# Patient Record
Sex: FEMALE | Race: WHITE | NOT HISPANIC OR LATINO | Employment: FULL TIME | URBAN - METROPOLITAN AREA
[De-identification: names, ages, dates, MRNs, and addresses within clinical notes are randomized per-mention and may not be internally consistent; named-entity substitution may affect disease eponyms.]

---

## 2024-02-07 ENCOUNTER — TELEPHONE (OUTPATIENT)
Dept: ORTHOPEDIC SURGERY | Facility: CLINIC | Age: 34
End: 2024-02-07
Payer: COMMERCIAL

## 2024-02-28 ENCOUNTER — OFFICE VISIT (OUTPATIENT)
Dept: PRIMARY CARE | Facility: CLINIC | Age: 34
End: 2024-02-28
Payer: COMMERCIAL

## 2024-02-28 VITALS
BODY MASS INDEX: 25.34 KG/M2 | WEIGHT: 134.2 LBS | HEART RATE: 74 BPM | TEMPERATURE: 98.1 F | DIASTOLIC BLOOD PRESSURE: 70 MMHG | SYSTOLIC BLOOD PRESSURE: 130 MMHG | HEIGHT: 61 IN | OXYGEN SATURATION: 100 %

## 2024-02-28 DIAGNOSIS — Z00.00 PHYSICAL EXAM: Primary | ICD-10-CM

## 2024-02-28 DIAGNOSIS — F41.9 ANXIETY: ICD-10-CM

## 2024-02-28 DIAGNOSIS — R53.83 TIRED: ICD-10-CM

## 2024-02-28 PROCEDURE — 99395 PREV VISIT EST AGE 18-39: CPT | Performed by: FAMILY MEDICINE

## 2024-02-28 PROCEDURE — 1036F TOBACCO NON-USER: CPT | Performed by: FAMILY MEDICINE

## 2024-02-28 RX ORDER — HYDROXYZINE HYDROCHLORIDE 25 MG/1
50 TABLET, FILM COATED ORAL NIGHTLY PRN
Qty: 60 TABLET | Refills: 1 | Status: SHIPPED | OUTPATIENT
Start: 2024-02-28

## 2024-02-28 RX ORDER — FLUOXETINE HYDROCHLORIDE 20 MG/1
20 CAPSULE ORAL DAILY
Qty: 30 CAPSULE | Refills: 11 | Status: SHIPPED | OUTPATIENT
Start: 2024-02-28 | End: 2025-02-27

## 2024-02-28 RX ORDER — LEVONORGESTREL 52 MG/1
1 INTRAUTERINE DEVICE INTRAUTERINE ONCE
COMMUNITY

## 2024-02-28 ASSESSMENT — PROMIS GLOBAL HEALTH SCALE
RATE_AVERAGE_FATIGUE: MILD
RATE_GENERAL_HEALTH: GOOD
RATE_MENTAL_HEALTH: POOR
RATE_PHYSICAL_HEALTH: GOOD
CARRYOUT_PHYSICAL_ACTIVITIES: COMPLETELY
RATE_AVERAGE_PAIN: 0
CARRYOUT_SOCIAL_ACTIVITIES: GOOD
RATE_QUALITY_OF_LIFE: GOOD
RATE_SOCIAL_SATISFACTION: FAIR
EMOTIONAL_PROBLEMS: OFTEN

## 2024-02-28 ASSESSMENT — PATIENT HEALTH QUESTIONNAIRE - PHQ9
2. FEELING DOWN, DEPRESSED OR HOPELESS: NOT AT ALL
1. LITTLE INTEREST OR PLEASURE IN DOING THINGS: NOT AT ALL
SUM OF ALL RESPONSES TO PHQ9 QUESTIONS 1 AND 2: 0

## 2024-02-28 ASSESSMENT — PAIN SCALES - GENERAL: PAINLEVEL: 0-NO PAIN

## 2024-02-28 NOTE — PROGRESS NOTES
"Subjective   Patient ID: Caryl Ramachandran is a 33 y.o. female who presents for Annual Exam.    Physical exam  Tired  Anxiety         Review of Systems   Constitutional:  Negative for fever.        Also see HPI   Eyes:  Negative for visual disturbance.   Respiratory:  Negative for shortness of breath.    Cardiovascular:  Negative for chest pain.   Gastrointestinal:  Negative for diarrhea and nausea.   Endocrine: Negative.    Genitourinary:  Negative for difficulty urinating.   Skin:  Negative for rash.   Neurological:  Negative for dizziness.        No focal deficits   Psychiatric/Behavioral:  Negative for suicidal ideas.    All other systems reviewed and are negative.      Objective   /70   Pulse 74   Temp 36.7 °C (98.1 °F)   Ht 1.549 m (5' 1\")   Wt 60.9 kg (134 lb 3.2 oz)   LMP  (LMP Unknown) Comment: mirena  SpO2 100%   BMI 25.36 kg/m²     Physical Exam  Vitals and nursing note reviewed.   Constitutional:       Appearance: Normal appearance.   HENT:      Head: Normocephalic and atraumatic.   Eyes:      Extraocular Movements: Extraocular movements intact.      Conjunctiva/sclera: Conjunctivae normal.   Cardiovascular:      Rate and Rhythm: Normal rate and regular rhythm.      Heart sounds: Normal heart sounds.   Pulmonary:      Effort: Pulmonary effort is normal.      Breath sounds: Normal breath sounds.      Comments: Lungs essentially CTA b/l  Abdominal:      General: There is no distension.      Palpations: Abdomen is soft. There is no mass.      Tenderness: There is no abdominal tenderness.   Musculoskeletal:      Right lower leg: No edema.      Left lower leg: No edema.   Skin:     Coloration: Skin is not jaundiced.      Findings: No rash.   Neurological:      General: No focal deficit present.      Mental Status: She is alert and oriented to person, place, and time.   Psychiatric:         Mood and Affect: Mood normal.         Behavior: Behavior normal.         Thought Content: Thought content " normal.         Judgment: Judgment normal.         Assessment/Plan   Diagnoses and all orders for this visit:  Physical exam  -     Comprehensive Metabolic Panel; Future  -     TSH with reflex to Free T4 if abnormal; Future  -     Vitamin B12; Future  -     CBC and Auto Differential; Future  Anxiety  -     hydrOXYzine HCL (Atarax) 25 mg tablet; Take 2 tablets (50 mg) by mouth as needed at bedtime (sleeping).  -     FLUoxetine (PROzac) 20 mg capsule; Take 1 capsule (20 mg) by mouth once daily.  Tired  -     Comprehensive Metabolic Panel; Future  -     TSH with reflex to Free T4 if abnormal; Future  -     Vitamin B12; Future  -     CBC and Auto Differential; Future

## 2024-03-12 ENCOUNTER — APPOINTMENT (OUTPATIENT)
Dept: PRIMARY CARE | Facility: CLINIC | Age: 34
End: 2024-03-12

## 2024-03-15 ASSESSMENT — ENCOUNTER SYMPTOMS
DIZZINESS: 0
ENDOCRINE NEGATIVE: 1
NAUSEA: 0
FEVER: 0
DIFFICULTY URINATING: 0
DIARRHEA: 0
SHORTNESS OF BREATH: 0

## 2024-03-26 ENCOUNTER — APPOINTMENT (OUTPATIENT)
Dept: OBSTETRICS AND GYNECOLOGY | Facility: CLINIC | Age: 34
End: 2024-03-26
Payer: COMMERCIAL

## 2024-03-27 ENCOUNTER — APPOINTMENT (OUTPATIENT)
Dept: PRIMARY CARE | Facility: CLINIC | Age: 34
End: 2024-03-27
Payer: COMMERCIAL

## 2024-03-29 ENCOUNTER — OFFICE VISIT (OUTPATIENT)
Dept: PRIMARY CARE | Facility: CLINIC | Age: 34
End: 2024-03-29
Payer: COMMERCIAL

## 2024-03-29 VITALS
WEIGHT: 131 LBS | HEIGHT: 61 IN | HEART RATE: 88 BPM | BODY MASS INDEX: 24.73 KG/M2 | OXYGEN SATURATION: 97 % | TEMPERATURE: 97.9 F | SYSTOLIC BLOOD PRESSURE: 140 MMHG | DIASTOLIC BLOOD PRESSURE: 100 MMHG

## 2024-03-29 DIAGNOSIS — F41.9 ANXIETY: Primary | ICD-10-CM

## 2024-03-29 PROCEDURE — 99213 OFFICE O/P EST LOW 20 MIN: CPT | Performed by: FAMILY MEDICINE

## 2024-03-29 RX ORDER — LORAZEPAM 1 MG/1
1 TABLET ORAL 2 TIMES DAILY PRN
Qty: 30 TABLET | Refills: 0 | Status: SHIPPED | OUTPATIENT
Start: 2024-03-29 | End: 2024-04-16

## 2024-03-29 ASSESSMENT — PAIN SCALES - GENERAL: PAINLEVEL: 0-NO PAIN

## 2024-03-29 NOTE — PROGRESS NOTES
"Subjective   Patient ID: Caryl Ramachandran is a 33 y.o. female who presents for Anxiety, Insomnia, and Medication Problem (Both not effective ).    Anxiety, not improved  No suicidal thoughts  Elevated blood pressure likely due to anxiety         Review of Systems   Constitutional:  Negative for fever.        Also see HPI   Eyes:  Negative for visual disturbance.   Respiratory:  Negative for shortness of breath.    Cardiovascular:  Negative for chest pain.   Gastrointestinal:  Negative for diarrhea and nausea.   Endocrine: Negative.    Genitourinary:  Negative for difficulty urinating.   Skin:  Negative for rash.   Neurological:  Negative for dizziness.        No focal deficits   Psychiatric/Behavioral:  Negative for suicidal ideas.    All other systems reviewed and are negative.      Objective   BP (!) 140/100   Pulse 88   Temp 36.6 °C (97.9 °F)   Ht 1.549 m (5' 1\")   Wt 59.4 kg (131 lb)   LMP 10/17/2018 Comment: mirena  SpO2 97%   BMI 24.75 kg/m²     Physical Exam  Vitals and nursing note reviewed.   Constitutional:       Appearance: Normal appearance.   HENT:      Head: Normocephalic and atraumatic.   Eyes:      Extraocular Movements: Extraocular movements intact.      Conjunctiva/sclera: Conjunctivae normal.   Cardiovascular:      Rate and Rhythm: Normal rate and regular rhythm.      Heart sounds: Normal heart sounds.   Pulmonary:      Effort: Pulmonary effort is normal.      Breath sounds: Normal breath sounds.      Comments: Lungs essentially CTA b/l  Abdominal:      General: There is no distension.      Palpations: Abdomen is soft. There is no mass.      Tenderness: There is no abdominal tenderness.   Musculoskeletal:      Right lower leg: No edema.      Left lower leg: No edema.   Skin:     Coloration: Skin is not jaundiced.      Findings: No rash.   Neurological:      General: No focal deficit present.      Mental Status: She is alert and oriented to person, place, and time.   Psychiatric:         Mood " and Affect: Mood normal.         Behavior: Behavior normal.         Thought Content: Thought content normal.         Judgment: Judgment normal.         Assessment/Plan   Diagnoses and all orders for this visit:  Anxiety  -     LORazepam (Ativan) 1 mg tablet; Take 1 tablet (1 mg) by mouth 2 times a day as needed for anxiety.

## 2024-04-01 ASSESSMENT — ENCOUNTER SYMPTOMS
NAUSEA: 0
FEVER: 0
SHORTNESS OF BREATH: 0
DIZZINESS: 0
ENDOCRINE NEGATIVE: 1
DIARRHEA: 0
DIFFICULTY URINATING: 0

## 2024-04-14 DIAGNOSIS — F41.9 ANXIETY: ICD-10-CM

## 2024-04-16 RX ORDER — LORAZEPAM 1 MG/1
1 TABLET ORAL 2 TIMES DAILY PRN
Qty: 60 TABLET | Refills: 1 | Status: SHIPPED | OUTPATIENT
Start: 2024-04-16

## 2024-05-29 ENCOUNTER — TELEPHONE (OUTPATIENT)
Dept: PRIMARY CARE | Facility: CLINIC | Age: 34
End: 2024-05-29
Payer: COMMERCIAL

## 2024-05-29 DIAGNOSIS — F41.9 ANXIETY: ICD-10-CM

## 2024-05-29 RX ORDER — IBUPROFEN AND FAMOTIDINE 26.6; 8 MG/1; MG/1
TABLET ORAL EVERY 8 HOURS
COMMUNITY
Start: 2023-04-12

## 2024-06-03 RX ORDER — HYDROXYZINE HYDROCHLORIDE 50 MG/1
50 TABLET, FILM COATED ORAL NIGHTLY PRN
Qty: 90 TABLET | Refills: 1 | Status: SHIPPED | OUTPATIENT
Start: 2024-06-03

## 2024-06-03 RX ORDER — LORAZEPAM 1 MG/1
TABLET ORAL
Refills: 0 | OUTPATIENT
Start: 2024-06-03

## 2024-06-03 RX ORDER — FLUOXETINE 20 MG/1
20 TABLET ORAL DAILY
Qty: 90 TABLET | Refills: 3 | Status: SHIPPED | OUTPATIENT
Start: 2024-06-03

## 2024-06-04 ENCOUNTER — HOSPITAL ENCOUNTER (OUTPATIENT)
Dept: RADIOLOGY | Facility: CLINIC | Age: 34
Discharge: HOME | End: 2024-06-04
Payer: COMMERCIAL

## 2024-06-04 ENCOUNTER — APPOINTMENT (OUTPATIENT)
Dept: OBSTETRICS AND GYNECOLOGY | Facility: CLINIC | Age: 34
End: 2024-06-04
Payer: COMMERCIAL

## 2024-06-04 ENCOUNTER — OFFICE VISIT (OUTPATIENT)
Dept: SPORTS MEDICINE | Facility: CLINIC | Age: 34
End: 2024-06-04
Payer: COMMERCIAL

## 2024-06-04 VITALS
DIASTOLIC BLOOD PRESSURE: 88 MMHG | SYSTOLIC BLOOD PRESSURE: 128 MMHG | HEIGHT: 61 IN | WEIGHT: 131 LBS | HEART RATE: 84 BPM | BODY MASS INDEX: 24.73 KG/M2

## 2024-06-04 DIAGNOSIS — T14.8XXA BONE BRUISE: ICD-10-CM

## 2024-06-04 DIAGNOSIS — S99.912A LEFT ANKLE INJURY, INITIAL ENCOUNTER: ICD-10-CM

## 2024-06-04 DIAGNOSIS — M25.572 ACUTE LEFT ANKLE PAIN: ICD-10-CM

## 2024-06-04 DIAGNOSIS — S93.402A SPRAIN OF LEFT ANKLE, INITIAL ENCOUNTER: ICD-10-CM

## 2024-06-04 DIAGNOSIS — S93.602A FOOT SPRAIN, LEFT, INITIAL ENCOUNTER: ICD-10-CM

## 2024-06-04 PROCEDURE — 73610 X-RAY EXAM OF ANKLE: CPT | Mod: LEFT SIDE | Performed by: RADIOLOGY

## 2024-06-04 PROCEDURE — 73610 X-RAY EXAM OF ANKLE: CPT | Mod: LT

## 2024-06-04 PROCEDURE — 29505 APPLICATION LONG LEG SPLINT: CPT | Performed by: NURSE PRACTITIONER

## 2024-06-04 PROCEDURE — 99214 OFFICE O/P EST MOD 30 MIN: CPT | Performed by: NURSE PRACTITIONER

## 2024-06-04 ASSESSMENT — PAIN SCALES - GENERAL: PAINLEVEL: 8

## 2024-06-04 ASSESSMENT — PATIENT HEALTH QUESTIONNAIRE - PHQ9
SUM OF ALL RESPONSES TO PHQ9 QUESTIONS 1 AND 2: 0
1. LITTLE INTEREST OR PLEASURE IN DOING THINGS: NOT AT ALL
2. FEELING DOWN, DEPRESSED OR HOPELESS: NOT AT ALL

## 2024-06-04 ASSESSMENT — ENCOUNTER SYMPTOMS
RESPIRATORY NEGATIVE: 1
LOSS OF SENSATION IN FEET: 0
MYALGIAS: 1
CARDIOVASCULAR NEGATIVE: 1
DEPRESSION: 0
OCCASIONAL FEELINGS OF UNSTEADINESS: 0
CONSTITUTIONAL NEGATIVE: 1
ARTHRALGIAS: 1

## 2024-06-04 NOTE — PATIENT INSTRUCTIONS
May use PRICE therapy as needed.  Start into Physical Therapy 1-2 times a week for 8-10 weeks with manual therapy as well as dry needling and IASTM  Stressed the importance of wearing shoes with good stability control to help with the biomechanics affecting the lower legs  Stressed the importance of wearing full foot insoles to help with the biomechanics affecting the lower legs  Recommendation over-the-counter calcium 500mg, 3 times a day with vitamin-D3 1314-6763+ units a day with food as well as a daily multivitamin  Recommendation over-the-counter Move Free for joint health  May take OTC Tylenol Extra Strength or OTC Tylenol Arthritis, taking one every 6-8 hours with food as needed for pain management.  Patient advised regarding the risk and/or potential adverse reactions and/or side effects of any prescribed medications along with any over-the-counter medications or any supplements used. Patient advised to seek immediate medical care if any adverse reactions occur. The patient and/or patient(s) parent(s) verbalized their understanding.  Discussed in detail with the patient to the level of their understanding the possibility in the future of regenerative injections versus corticosteroids injections  Possibility in future of MRI of LEFT ANKLE to rule out tendon vs ligament vs tear vs fracture vs other, MSK to read.  Patient was given a TALL WALKING BOOT brace for their LEFT ANKLE injury/condition. The patient is ambulatory with or without aid and feels more stable with the brace on. The brace definitely helps improve their function as well as stability. Verbal and written instructions for the use, wear schedule, cleaning and application of this brace were given. Patient was instructed that should the brace result in increased pain, decreased sensation, numbness and/or tingling, increased swelling, or an overall worsening of their medical condition; to please contact our office immediately. Orthotic/brace  management and training was provided for skin care, modifications due to healing tissues, edema changes, interruption in skin integrity and safety precautions with the Orthosis/brace.   Follow up

## 2024-06-04 NOTE — PROGRESS NOTES
Established patient  History Of Present Illness  Caryl Ramachandran is a 33 y.o. female presenting with LEFT ankle pain. Pt states that two weeks ago, while at her camper, she had tripped over a piece of wood, hurting her LEFT ankle. Pain is primarily on the lateral aspect of the ankle, around the lateral malleolus. Swelling present. Pain with plantarflexion and dorsiflexion. Denies any numbness or tingling. Rates current pain 8/10 describing it as a constant pain. No previous history of injury.  We reviewed patient x-ray results in detail.  Patient verbalizes understanding of results.  We discussed treatment options and patient appears to have a lateral ankle sprain with possible bone bruise.  After discussion we will place patient into a tall walking boot and she can follow-up in 3 weeks for reevaluation and jameel-ray.  Patient will start into physical therapy as soon as she is able and we can address at next appointment.  Patient verbalizes understanding and agreement with plan of care.    Past Medical History  She has a past medical history of Allergic and Anxiety.    Surgical History  She has no past surgical history on file.     Social History  She reports that she has never smoked. She has never been exposed to tobacco smoke. She has never used smokeless tobacco. She reports that she does not currently use alcohol after a past usage of about 3.0 standard drinks of alcohol per week. She reports that she does not use drugs.    Family History  Family History   Problem Relation Name Age of Onset    No Known Problems Mother      Colon cancer Father Oskar watts     No Known Problems Sister      No Known Problems Sister      No Known Problems Sister      No Known Problems Sister      No Known Problems Sister      No Known Problems Brother      No Known Problems Brother      No Known Problems Brother      No Known Problems Brother      No Known Problems Brother      No Known Problems Son      No Known Problems Son      No  "Known Problems Son       Allergies  [place] and Bee venom protein (honey bee)    Review of Systems  Review of Systems   Constitutional: Negative.    Respiratory: Negative.     Cardiovascular: Negative.    Musculoskeletal:  Positive for arthralgias and myalgias.   All other systems reviewed and are negative.    Last Recorded Vitals  /88 (BP Location: Right arm, Patient Position: Sitting, BP Cuff Size: Adult)   Pulse 84   Ht 1.549 m (5' 1\")   Wt 59.4 kg (131 lb)   BMI 24.75 kg/m²      Examination  Left    Edema: Positive  Ecchymosis/Bruising: Negative  Percussion Test: Positive   [place] Test: Positive     Orientation:    Positive Asymmetrical, because of  swelling.     ROM:   Positive, Decreased plantarflexion and dorsiflexion.           Muscle Strength:   Positive +4/+5 Planar Flexion, Decreased due to pain  Positive +4/+5 Dorsi Flexion, Decreased due to pain  Positive +3/+5 Inversion, Decreased due to pain  Positive +3/+5 Eversion, Decreased due to pain        Positive +4/+5 Plantarflexion in combination with inversion, Decreased due to pain  Positive +4/+5 Plantarflexion in combination with eversion, Decreased due to pain    Positive +4/+5 Dorsiflexion in combination with inversion (Posterior Tibialis), Decreased due to pain  Positive +4/+5 Dorsiflexion in combination with eversion (Peroneal Brevis), Decreased due to pain  Positive +4/+5 Dorsiflexion in combination with eversion and flexion of great toe (Peroneal Longus), Decreased due to pain.            Palpation:   Positive , Painful, Tender to Palpation over the anterior and posterior tib/fib ligaments, lateral aspect of ankle, lateral malleolus.            Vascular:   +2/+4 Dorsalis Pedis  +2/+4 Posterior Tibialis  Capillary Refill < 2 Seconds.     Leg/Ankle/Foot - Ankle Impingement:  Posterior Ankle Impingement Test: Negative.   Anterior Ankle Impingement Test: Negative.            Leg/Ankle/Foot - Ankle Instability:  Flexibility Test:  Positive "   Anterior Drawer Test:  Positive    Talar Tilt Test:  Positive   External Rotation Kleiger Plantar Flexion Test:  Negative.   External Rotation Kleiger Dorsiflexion Test:  Negative.   Squeeze Test:  Positive   Dorsiflexion Test:  Positive   Posterior Tibial Instability Test: Negative.  Peroneal Tendon Instability Test:  Positive  Horizontal Squeeze Test:  Positive  Vertical Squeeze Test:  Negative.   Plantarflexion:  Positive  Standing/Walking Test:  Positive, Painful.   Tibial Torsion Test:  Positive       Leg/Ankle/Foot - DVT:  Arianna's Sign: Negative.            Leg/Ankle/Foot - Forefoot:  Strunsky Test:  Negative.   Gaenslen Maneuver Test:  Negative.   Metatarsal Tap Test:  Negative.   Crepitation Test:  Negative.         Leg/Ankle/Foot - Hindfoot Achilles/Calcaneus:  Bledsoe Compression Test:  Negative.   Hoffa Sign:  Negative.   Achilles Tendon Tap Test:  Negative.   Heel Compression Test:  Negative.   Heel Thump Test: Negative    Feet/Foot:   Positive BILATERAL Valgus foot       Imaging and Diagnostics Review:  Plain radiographs were ordered and independently reviewed in the presence of the family.    Assessment   1. Acute left ankle pain    2. Left ankle injury, initial encounter    3. Sprain of left ankle, initial encounter    4. Foot sprain, left, initial encounter    5. Bone bruise        Plan   Treatment or Intervention:  May use PRICE therapy as needed.  Start into Physical Therapy 1-2 times a week for 8-10 weeks with manual therapy as well as dry needling and IASTM  Stressed the importance of wearing shoes with good stability control to help with the biomechanics affecting the lower legs  Stressed the importance of wearing full foot insoles to help with the biomechanics affecting the lower legs  Recommendation over-the-counter calcium 500mg, 3 times a day with vitamin-D3 8267-6910+ units a day with food as well as a daily multivitamin  Recommendation over-the-counter Move Free for joint health  May  take OTC Tylenol Extra Strength or OTC Tylenol Arthritis, taking one every 6-8 hours with food as needed for pain management.  Patient advised regarding the risk and/or potential adverse reactions and/or side effects of any prescribed medications along with any over-the-counter medications or any supplements used. Patient advised to seek immediate medical care if any adverse reactions occur. The patient and/or patient(s) parent(s) verbalized their understanding.  Discussed in detail with the patient to the level of their understanding the possibility in the future of regenerative injections versus corticosteroids injections  Possibility in future of MRI of LEFT ANKLE to rule out tendon vs ligament vs tear vs fracture vs other, MSK to read.  Patient was given a TALL WALKING BOOT brace for their LEFT ANKLE injury/condition. The patient is ambulatory with or without aid and feels more stable with the brace on. The brace definitely helps improve their function as well as stability. Verbal and written instructions for the use, wear schedule, cleaning and application of this brace were given. Patient was instructed that should the brace result in increased pain, decreased sensation, numbness and/or tingling, increased swelling, or an overall worsening of their medical condition; to please contact our office immediately. Orthotic/brace management and training was provided for skin care, modifications due to healing tissues, edema changes, interruption in skin integrity and safety precautions with the Orthosis/brace.     Diagnostic studies:      Activity Instructions, Restrictions, and Accommodations:      Consultations/Referrals:  Physical therapy    Follow-up:  3-4 weeks with Xray  Total appointment time _30_ minutes. Greater than 50% spent counseling patient on results of physical exam, treatment options as well as results of ordered imaging and treatment for results, need for PT and expected outcomes, as well as  discussing possible medications.    HUGO GUZMAN on 6/4/24 at 9:00 AM.     Hugo Guzman CNP

## 2024-06-21 ENCOUNTER — OFFICE VISIT (OUTPATIENT)
Dept: ORTHOPEDIC SURGERY | Facility: CLINIC | Age: 34
End: 2024-06-21
Payer: COMMERCIAL

## 2024-06-21 DIAGNOSIS — S93.402A SPRAIN OF LEFT ANKLE, INITIAL ENCOUNTER: Primary | ICD-10-CM

## 2024-06-21 PROCEDURE — 99203 OFFICE O/P NEW LOW 30 MIN: CPT | Performed by: STUDENT IN AN ORGANIZED HEALTH CARE EDUCATION/TRAINING PROGRAM

## 2024-06-21 PROCEDURE — 99213 OFFICE O/P EST LOW 20 MIN: CPT | Performed by: STUDENT IN AN ORGANIZED HEALTH CARE EDUCATION/TRAINING PROGRAM

## 2024-06-21 ASSESSMENT — PAIN - FUNCTIONAL ASSESSMENT: PAIN_FUNCTIONAL_ASSESSMENT: 0-10

## 2024-06-21 ASSESSMENT — PAIN SCALES - GENERAL: PAINLEVEL_OUTOF10: 7

## 2024-06-21 ASSESSMENT — PAIN DESCRIPTION - DESCRIPTORS: DESCRIPTORS: ACHING;BURNING;THROBBING;STABBING

## 2024-06-21 NOTE — PROGRESS NOTES
ORTHOPAEDIC SURGERY HISTORY & PHYSICAL     Chief Complaint:  Left ankle pain    History Of Present Illness  Caryl Ramachandran is a 33 y.o. female who presents for evaluation of left ankle pain, self-referred.  Patient reports that in mid May she sustained a twisting injury to her ankle when she fell over a piece of wood.  She reports no prior history of trauma to this ankle.  She was previously seen and evaluated by one of our sports medicine specialist and recommended to ambulate with use of a walking boot.  The boot has been cumbersome for her and so she has not been wearing it.  She is reporting persistent, 7 out of 10 pain.  This made worse with walking and standing.  She denies any associated numbness, tingling or weakness.  She has not had repeated instability events including the ankle catching, giving out or giving way.  Patient otherwise remains in her usual state of health.     Past Medical History  Past Medical History:   Diagnosis Date    Allergic     Anxiety        Surgical History  Recent Surgeries in Orthopaedic Surgery            No cases to display             Social History  Social History     Socioeconomic History    Marital status:      Spouse name: Franklin    Number of children: 3    Years of education: Not on file    Highest education level: Not on file   Occupational History    Occupation: sport medicine   Tobacco Use    Smoking status: Never     Passive exposure: Never    Smokeless tobacco: Never   Vaping Use    Vaping status: Never Used   Substance and Sexual Activity    Alcohol use: Not Currently     Alcohol/week: 3.0 standard drinks of alcohol     Types: 3 Shots of liquor per week     Comment: Not every week. Once in a while    Drug use: Never    Sexual activity: Yes     Partners: Male     Birth control/protection: I.U.D.   Other Topics Concern    Not on file   Social History Narrative    Not on file     Social Determinants of Health     Financial Resource Strain: Not on file   Food  Insecurity: Not on file   Transportation Needs: Not on file   Physical Activity: Not on file   Stress: Not on file   Social Connections: Not on file   Intimate Partner Violence: Not on file   Housing Stability: Not on file       Family History  Family History   Problem Relation Name Age of Onset    No Known Problems Mother      Colon cancer Father Oskar watts     No Known Problems Sister      No Known Problems Sister      No Known Problems Sister      No Known Problems Sister      No Known Problems Sister      No Known Problems Brother      No Known Problems Brother      No Known Problems Brother      No Known Problems Brother      No Known Problems Brother      No Known Problems Son      No Known Problems Son      No Known Problems Son          Allergies  Allergies   Allergen Reactions    [place] Anaphylaxis, Hives and Swelling    Bee Venom Protein (Honey Bee) Swelling       Review of Systems  REVIEW OF SYSTEMS  Constitutional: no unplanned weight loss  Psychiatric: no suicidal ideation  ENT: no vision changes, no sinus problems  Pulmonary: no shortness of breath  Lymphatic: no enlarged lymph nodes  Cardiovascular: no chest pain or shortness of breath  Gastrointestinal: no stomach problems  Genitourinary: no dysuria   Skin: no rashes  Endocrine: no thyroid problems  Neurological: no headache, no numbness  Hematological: no easy bruising  Musculoskeletal: Left ankle pain    Physical Exam  PHYSICAL EXAMINATION  Constitutional Exam: well developed and well nourished  Psychiatric Exam: alert and oriented, appropriate mood and behavior  Eye Exam: EOMI  Pulmonary Exam: breathing non-labored, no apparent distress  Lymphatic exam: no appreciable lymphadenopathy in the lower extremities  Cardiovascular exam: RRR to peripheral palpation, DP pulses 2+, PT 2+, toes are pink with good capillary refill, no pitting edema  Skin exam: no open lesions, rashes, abrasions or ulcerations  Neurological exam: sensation to light touch  intact in both lower extremities in peripheral and dermatomal distributions (except for any abnormalities noted in musculoskeletal exam)    Musculoskeletal exam: Left lower extremity examination.  Patient pain localized about the anterolateral ankle.  She is focally tender to palpation about the ATFL and AITFL.  Minimally tender to palpation at the CFL and peroneal tendons.  Patient has supple but painful ankle range of motion, otherwise supple and pain-free subtalar and midtarsal joint range of motion.  Patient has sensation intact light touch grossly in a saphenous, sural, superficial peroneal, deep peroneal and tibial nerve distribution.  She has intact but pain limited PF/DF/EHL.  She has 2+ DP/PT pulse palpated.  She has a positive anterolateral impingement test with positive syndesmotic squeeze test.  Positive anterior drawer, supraphysiologic talar tilt comparable to the contralateral side.    Last Recorded Vitals  There were no vitals taken for this visit.    Laboratory Results    No results found for this or any previous visit (from the past 24 hour(s)).    Radiology Results   X-ray imaging 3 view weightbearing left ankle reviewed from 06/04/2024 and independently evaluated by me demonstrates no acute fracture or dislocation.  There are increased distal medial and lateral gutter osteophytes with dorsal talar neck and distal tibial osteophytes anteriorly noted as well.  Patient has incidental note of calcaneal enthesophyte.    Assessment/Plan:  33-year-old female who my impression has left ankle pain likely stemming from ankle sprain involving at least ATFL, possibility of syndesmotic injury.  I have reviewed the diagnosis and treatment options extensively with the patient.  I recommend the patient continue weightbearing to her tolerance in her left lower extremity.  I will provide her with information regarding a lace up style ankle brace and recommend that she begin a formal physical therapy program for  ankle range of motion, proprioceptive training and strengthening.  I will plan to see the patient back in approximately 6 weeks for discussion regarding next treatment steps.  If the patient is not clinically improving, would recommend obtaining an MRI of her left ankle without contrast to more completely evaluate her lateral ligament complex and rule out the possibility of syndesmotic injury.  Upon return, patient would require three-view weightbearing left ankle.    Mazin Ross MD, VERONIKA  Department of Orthopaedic Surgery  [facility]    The diagnosis and treatment plan were reviewed with the patient. All questions were answered. The patient verbalized understanding of the treatment plan. There were no barriers to understanding identified.    Note dictated with [employer] software.  Completed without full type editing and sent to avoid delay.

## 2024-06-28 ENCOUNTER — APPOINTMENT (OUTPATIENT)
Dept: PRIMARY CARE | Facility: CLINIC | Age: 34
End: 2024-06-28
Payer: COMMERCIAL

## 2024-07-09 ENCOUNTER — APPOINTMENT (OUTPATIENT)
Dept: PRIMARY CARE | Facility: CLINIC | Age: 34
End: 2024-07-09
Payer: COMMERCIAL

## 2024-07-19 ENCOUNTER — HOSPITAL ENCOUNTER (OUTPATIENT)
Dept: RADIOLOGY | Facility: CLINIC | Age: 34
Discharge: HOME | End: 2024-07-19
Payer: COMMERCIAL

## 2024-07-19 ENCOUNTER — APPOINTMENT (OUTPATIENT)
Dept: PRIMARY CARE | Facility: CLINIC | Age: 34
End: 2024-07-19
Payer: COMMERCIAL

## 2024-07-19 ENCOUNTER — OFFICE VISIT (OUTPATIENT)
Dept: ORTHOPEDIC SURGERY | Facility: CLINIC | Age: 34
End: 2024-07-19
Payer: COMMERCIAL

## 2024-07-19 DIAGNOSIS — M25.872 IMPINGEMENT OF LEFT ANKLE JOINT: ICD-10-CM

## 2024-07-19 DIAGNOSIS — S93.402A SPRAIN OF LEFT ANKLE, INITIAL ENCOUNTER: ICD-10-CM

## 2024-07-19 DIAGNOSIS — S93.432A ANKLE SYNDESMOSIS DISRUPTION, LEFT, INITIAL ENCOUNTER: Primary | ICD-10-CM

## 2024-07-19 PROCEDURE — 73610 X-RAY EXAM OF ANKLE: CPT | Mod: LT

## 2024-07-19 PROCEDURE — 99213 OFFICE O/P EST LOW 20 MIN: CPT | Performed by: STUDENT IN AN ORGANIZED HEALTH CARE EDUCATION/TRAINING PROGRAM

## 2024-07-19 ASSESSMENT — PAIN DESCRIPTION - DESCRIPTORS: DESCRIPTORS: STABBING;TINGLING

## 2024-07-19 ASSESSMENT — PAIN - FUNCTIONAL ASSESSMENT: PAIN_FUNCTIONAL_ASSESSMENT: 0-10

## 2024-07-19 ASSESSMENT — PAIN SCALES - GENERAL: PAINLEVEL_OUTOF10: 6

## 2024-07-19 NOTE — PROGRESS NOTES
ORTHOPAEDIC SURGERY PROGRESS NOTE    Chief Complaint:  Left ankle pain    History Of Present Illness  Caryl Ramachandran is a 33 y.o. female who presents for evaluation of left ankle pain, self-referred.  Patient reports that in mid May she sustained a twisting injury to her ankle when she fell over a piece of wood.  She reports no prior history of trauma to this ankle.  She was previously seen and evaluated by one of our sports medicine specialist and recommended to ambulate with use of a walking boot.  The boot has been cumbersome for her and so she has not been wearing it.  She is reporting persistent, 7 out of 10 pain.  This made worse with walking and standing.  She denies any associated numbness, tingling or weakness.  She has not had repeated instability events including the ankle catching, giving out or giving way.  Patient otherwise remains in her usual state of health.    07/19/2024: Patient returns for follow-up of her left ankle pain.  She has trialed physical therapy and has been using a brace without significant improvement in her pain.  Pain is still rated as 6 out of 10.  Pain is made worse with walking and standing.  She is having difficulty with her activities of daily living due to pain.     Past Medical History  Past Medical History:   Diagnosis Date    Allergic     Anxiety        Surgical History  Recent Surgeries in Orthopaedic Surgery            No cases to display             Social History  Social History     Socioeconomic History    Marital status:      Spouse name: Franklin    Number of children: 3   Occupational History    Occupation: sport medicine   Tobacco Use    Smoking status: Never     Passive exposure: Never    Smokeless tobacco: Never   Vaping Use    Vaping status: Never Used   Substance and Sexual Activity    Alcohol use: Not Currently     Alcohol/week: 3.0 standard drinks of alcohol     Types: 3 Shots of liquor per week     Comment: Not every week. Once in a while    Drug  use: Never    Sexual activity: Yes     Partners: Male     Birth control/protection: I.U.D.       Family History  Family History   Problem Relation Name Age of Onset    No Known Problems Mother      Colon cancer Father Oskar watts     No Known Problems Sister      No Known Problems Sister      No Known Problems Sister      No Known Problems Sister      No Known Problems Sister      No Known Problems Brother      No Known Problems Brother      No Known Problems Brother      No Known Problems Brother      No Known Problems Brother      No Known Problems Son      No Known Problems Son      No Known Problems Son          Allergies  Allergies   Allergen Reactions    [place] Anaphylaxis, Hives and Swelling    Bee Venom Protein (Honey Bee) Swelling       Review of Systems  REVIEW OF SYSTEMS  Constitutional: no unplanned weight loss  Psychiatric: no suicidal ideation  ENT: no vision changes, no sinus problems  Pulmonary: no shortness of breath  Lymphatic: no enlarged lymph nodes  Cardiovascular: no chest pain or shortness of breath  Gastrointestinal: no stomach problems  Genitourinary: no dysuria   Skin: no rashes  Endocrine: no thyroid problems  Neurological: no headache, no numbness  Hematological: no easy bruising  Musculoskeletal: Left ankle pain    Physical Exam  PHYSICAL EXAMINATION  Constitutional Exam: well developed and well nourished  Psychiatric Exam: alert and oriented, appropriate mood and behavior  Eye Exam: EOMI  Pulmonary Exam: breathing non-labored, no apparent distress  Lymphatic exam: no appreciable lymphadenopathy in the lower extremities  Cardiovascular exam: RRR to peripheral palpation, DP pulses 2+, PT 2+, toes are pink with good capillary refill, no pitting edema  Skin exam: no open lesions, rashes, abrasions or ulcerations  Neurological exam: sensation to light touch intact in both lower extremities in peripheral and dermatomal distributions (except for any abnormalities noted in musculoskeletal  exam)    Musculoskeletal exam: Left lower extremity examination.  Patient pain continues to localize to the anterolateral ankle.  She is tender to palpation overlying the ATFL, AITFL, nontender to palpation of the CFL and peroneal tendons.  There is a mild effusion on examination today.  Patient has supple but painful ankle range of motion, I am able to range her to neutral dorsiflexion.  She is otherwise pain-free and supple subtalar midtarsal joint range of motion. Patient has sensation intact light touch grossly in a saphenous, sural, superficial peroneal, deep peroneal and tibial nerve distribution.  She has intact but pain limited PF/DF/EHL.  She has 2+ DP/PT pulse palpated.  She has a positive anterolateral impingement test with positive syndesmotic squeeze test.  Positive anterior drawer, supraphysiologic talar tilt comparable to the contralateral side.  Patient is unable to perform a single-leg hop on the left, she is on the right.  Following stabilization taping, the patient has improved performance with single-leg hop on the left.    Last Recorded Vitals  There were no vitals taken for this visit.    Laboratory Results    No results found for this or any previous visit (from the past 24 hour(s)).    Radiology Results   X-ray imaging 3 view weightbearing left ankle reviewed from 07/19/2024 and independently evaluated by me demonstrates no acute fracture or dislocation.  There are increased anterior distal tibial and dorsal talar neck osteophytes.  The ankle mortise appears well aligned.  There is no obvious increased distal tib-fib clear space.    Assessment/Plan:  33-year-old female who in my impression has left ankle sprain clinically suspicious for syndesmotic injury with positive stabilization tape test as well as syndesmotic squeeze test in the setting of anterior ankle impingement.  I have reviewed the diagnosis and treatment options with the patient.  I recommend the patient continue weightbearing to  her tolerance in her left lower extremity utilizing a lace up style ankle brace for support.  As she has had difficulty progressing with physical therapy and her pain has continued I would recommend obtaining an MRI of her left ankle without contrast to more completely evaluate her lateral ligament complex, rule out the possibility of a syndesmotic injury and an osteochondral lesion of the talus.  Upon return, patient would not require further imaging.    Mazin Ross MD, VERONIKA  Department of Orthopaedic Surgery  [facility]    The diagnosis and treatment plan were reviewed with the patient. All questions were answered. The patient verbalized understanding of the treatment plan. There were no barriers to understanding identified.    Note dictated with [employer] software.  Completed without full type editing and sent to avoid delay.

## 2024-07-22 ENCOUNTER — TELEMEDICINE (OUTPATIENT)
Dept: PRIMARY CARE | Facility: CLINIC | Age: 34
End: 2024-07-22
Payer: COMMERCIAL

## 2024-07-22 DIAGNOSIS — F41.9 ANXIETY: Primary | ICD-10-CM

## 2024-07-22 PROCEDURE — 99213 OFFICE O/P EST LOW 20 MIN: CPT | Performed by: FAMILY MEDICINE

## 2024-07-22 PROCEDURE — 1036F TOBACCO NON-USER: CPT | Performed by: FAMILY MEDICINE

## 2024-07-22 RX ORDER — BUSPIRONE HYDROCHLORIDE 10 MG/1
10 TABLET ORAL 2 TIMES DAILY
Qty: 60 TABLET | Refills: 11 | Status: SHIPPED | OUTPATIENT
Start: 2024-07-22

## 2024-07-22 RX ORDER — FLUOXETINE HYDROCHLORIDE 40 MG/1
40 CAPSULE ORAL DAILY
Qty: 30 CAPSULE | Refills: 11 | Status: SHIPPED | OUTPATIENT
Start: 2024-07-22

## 2024-07-22 ASSESSMENT — ENCOUNTER SYMPTOMS
DIARRHEA: 0
LOSS OF SENSATION IN FEET: 0
ENDOCRINE NEGATIVE: 1
FEVER: 0
SHORTNESS OF BREATH: 0
NAUSEA: 0
OCCASIONAL FEELINGS OF UNSTEADINESS: 0
DIZZINESS: 0
DEPRESSION: 0
DIFFICULTY URINATING: 0

## 2024-07-22 ASSESSMENT — PATIENT HEALTH QUESTIONNAIRE - PHQ9
2. FEELING DOWN, DEPRESSED OR HOPELESS: NOT AT ALL
SUM OF ALL RESPONSES TO PHQ9 QUESTIONS 1 AND 2: 0
1. LITTLE INTEREST OR PLEASURE IN DOING THINGS: NOT AT ALL

## 2024-07-22 NOTE — PROGRESS NOTES
Subjective   Patient ID: Caryl Ramachandran is a 33 y.o. female who presents for Anxiety (Depression, anxiety/).    Telemedicine visit with audio and video  Patient with anxiety and depression, no suicidal thoughts or plan  Stress increased, feels on edge, lacks motivation, sad at times.  Taking Fluoxetine 20 mg    Anxiety  Patient reports no chest pain, dizziness, nausea, shortness of breath or suicidal ideas.            Review of Systems   Constitutional:  Negative for fever.        Also see HPI   Eyes:  Negative for visual disturbance.   Respiratory:  Negative for shortness of breath.    Cardiovascular:  Negative for chest pain.   Gastrointestinal:  Negative for diarrhea and nausea.   Endocrine: Negative.    Genitourinary:  Negative for difficulty urinating.   Skin:  Negative for rash.   Neurological:  Negative for dizziness.        No focal deficits   Psychiatric/Behavioral:  Negative for suicidal ideas.    All other systems reviewed and are negative.      Objective   There were no vitals taken for this visit.    Physical Exam  Constitutional:       Appearance: Normal appearance.   HENT:      Head: Normocephalic and atraumatic.   Eyes:      Conjunctiva/sclera: Conjunctivae normal.   Pulmonary:      Effort: Pulmonary effort is normal.   Neurological:      Mental Status: She is oriented to person, place, and time.   Psychiatric:         Behavior: Behavior normal.         Thought Content: Thought content normal.         Judgment: Judgment normal.         Assessment/Plan   Diagnoses and all orders for this visit:  Anxiety  -     FLUoxetine (PROzac) 40 mg capsule; Take 1 capsule (40 mg) by mouth once daily.  -     busPIRone (Buspar) 10 mg tablet; Take 1 tablet (10 mg) by mouth 2 times a day.

## 2024-08-02 ENCOUNTER — APPOINTMENT (OUTPATIENT)
Dept: ORTHOPEDIC SURGERY | Facility: CLINIC | Age: 34
End: 2024-08-02
Payer: COMMERCIAL

## 2024-08-06 ENCOUNTER — APPOINTMENT (OUTPATIENT)
Dept: RADIOLOGY | Facility: CLINIC | Age: 34
End: 2024-08-06
Payer: COMMERCIAL

## 2024-08-09 ENCOUNTER — APPOINTMENT (OUTPATIENT)
Dept: ORTHOPEDIC SURGERY | Facility: CLINIC | Age: 34
End: 2024-08-09
Payer: COMMERCIAL

## 2024-10-01 ENCOUNTER — APPOINTMENT (OUTPATIENT)
Dept: OBSTETRICS AND GYNECOLOGY | Facility: CLINIC | Age: 34
End: 2024-10-01
Payer: COMMERCIAL

## 2024-10-30 ENCOUNTER — OFFICE VISIT (OUTPATIENT)
Dept: SPORTS MEDICINE | Facility: CLINIC | Age: 34
End: 2024-10-30
Payer: COMMERCIAL

## 2024-10-30 VITALS
HEART RATE: 75 BPM | BODY MASS INDEX: 24.73 KG/M2 | HEIGHT: 61 IN | WEIGHT: 131 LBS | SYSTOLIC BLOOD PRESSURE: 122 MMHG | DIASTOLIC BLOOD PRESSURE: 78 MMHG

## 2024-10-30 DIAGNOSIS — M79.645 FINGER PAIN, LEFT: ICD-10-CM

## 2024-10-30 DIAGNOSIS — L03.012 PARONYCHIA OF FINGER, LEFT: ICD-10-CM

## 2024-10-30 PROCEDURE — 3008F BODY MASS INDEX DOCD: CPT | Performed by: FAMILY MEDICINE

## 2024-10-30 RX ORDER — SULFAMETHOXAZOLE AND TRIMETHOPRIM 800; 160 MG/1; MG/1
1 TABLET ORAL 2 TIMES DAILY
Qty: 20 TABLET | Refills: 0 | Status: SHIPPED | OUTPATIENT
Start: 2024-10-30 | End: 2024-11-09

## 2024-10-30 RX ORDER — MUPIROCIN 20 MG/G
OINTMENT TOPICAL ONCE
Status: SHIPPED | OUTPATIENT
Start: 2024-10-30

## 2024-10-30 ASSESSMENT — ENCOUNTER SYMPTOMS
LOSS OF SENSATION IN FEET: 0
OCCASIONAL FEELINGS OF UNSTEADINESS: 0
DEPRESSION: 0

## 2024-11-04 ENCOUNTER — HOSPITAL ENCOUNTER (OUTPATIENT)
Dept: RADIOLOGY | Facility: CLINIC | Age: 34
Discharge: HOME | End: 2024-11-04
Payer: COMMERCIAL

## 2024-11-04 ENCOUNTER — OFFICE VISIT (OUTPATIENT)
Dept: SPORTS MEDICINE | Facility: CLINIC | Age: 34
End: 2024-11-04
Payer: COMMERCIAL

## 2024-11-04 DIAGNOSIS — L03.012 PARONYCHIA OF FINGER, LEFT: ICD-10-CM

## 2024-11-04 DIAGNOSIS — M79.645 FINGER PAIN, LEFT: ICD-10-CM

## 2024-11-04 PROCEDURE — 73130 X-RAY EXAM OF HAND: CPT | Mod: LT

## 2024-11-04 PROCEDURE — 73130 X-RAY EXAM OF HAND: CPT | Mod: LEFT SIDE | Performed by: RADIOLOGY

## 2024-11-04 RX ORDER — MUPIROCIN 20 MG/G
OINTMENT TOPICAL 2 TIMES DAILY
Status: SHIPPED | OUTPATIENT
Start: 2024-11-04

## 2024-11-04 NOTE — PROGRESS NOTES
Verbal consent of the patient and/or verbal parental consent for patients under the age of 18 have been obtained to conduct a physical examination at this office visit.    Established patient  History Of Present Illness  11/04/24 Caryl Ramachandran is a 34 y.o. female who presents for an evaluation of their Left middle finger.  Apparently she had her finger slammed in a car door and had x-rays done and there was no fracture.  Since then she has developed a paronychia that has become excessively painful and swollen and causing her some issues.  She denies any numbness, tingling, or pins and needle sensation.  She says it is throbbing throughout the day when she is trying to work.  She has been putting ice on it as well as taking some anti-inflammatories and Tylenol but it does not seem to be helping.  She additionally has been taking Bactrim DS over the past 7 days twice a day and keeping the area clean and dry.  She was additionally supposed to  Bactroban 2% cream to put on the area but has not been able to do so yet.  She still having excruciating pain on the medial aspect in the cuticle area with visible area of pus pocket.  She did get some relief last time with slight drainage but it is filled back up.  I got an MRI to take the form    All previous Progress Notes and imaging results related to this patients chief complaint have been reviewed in preparation for this examination.    Past Medical History  She has a past medical history of Allergic and Anxiety.    Surgical History  She has no past surgical history on file.     Social History  She reports that she has never smoked. She has never been exposed to tobacco smoke. She has never used smokeless tobacco. She reports that she does not currently use alcohol. She reports that she does not use drugs.    Family History  Family History   Problem Relation Name Age of Onset    No Known Problems Mother      Colon cancer Father Oskar watts     No Known Problems  Sister      No Known Problems Sister      No Known Problems Sister      No Known Problems Sister      No Known Problems Sister      No Known Problems Brother      No Known Problems Brother      No Known Problems Brother      No Known Problems Brother      No Known Problems Brother      No Known Problems Son      No Known Problems Son      No Known Problems Son          Allergies  Pana and Bee venom protein (honey bee)    Review of Systems  CONSTITUTIONAL:   Negative for weight change, loss of appetite, fatigue, weakness, fever, chills, night sweats, headaches .           HEENT:   Negative for cold, cough, sore throat, sinus pain, swollen lymph nodes.           OPHTHALMOLOGY:   Negative for diminished vision, blurred vision, loss of vision, double vision.           ALLERGY:   Negative for runny nose, scratchy throat, sinus congestion, rash, facial pressure, nasal congestion, post-nasal drip.           CARDIOLOGY:   Negative for chest pain, palpitations, murmurs, irregular heart beat, shortness of breath, leg edema, dyspnea on exertion, fatigue, dizziness.           RESPIRATORY:   Negative for chest pain, shortness of breath, swelling of the legs, asthma/copd, chest congestion, pain with breathing .           GASTROENTEROLOGY:   Negative for nausea, vomitting, heartburn, constipation, diarrhea, blood in stool, change in bowel habits, black stool.           HEMATOLOGY/LYMPH:   Negative for fatigue, loss of appetitie, easy bruising, easy bleeding, anemia, abnormal bleeding, slow healing.           ENDOCRINOLOGY:   Negative for polyuria, polydipsia, polyphagia, fatigue, weight loss, weight gain, cold intolerance, heat intolerance, diabetes.           MUSCULOSKELETAL:   Negative       DERMATOLOGY:   Positive left middle finger paronychia with swelling around the cuticle and some erythema noted       NEUROLOGY:   Negative for tingling, numbness, gait abnormality, paresthesias, weakness, sciatica.        General  Examination:  GENERAL APPEARANCE: Appears well, pleasant, and cooperative, NAD.   HEENT: Normal, unremarkable.   NECK: Supple.   HEART: RRR, normal S1S2.   LUNGS: Clear to auscultation bilaterally.   ABDOMEN: Soft, NT/ND, BS present.   EXTREMITIES: Positive left middle finger paronychia still has swelling around the cuticle however less erythema noted also  to palpation over cuticle area  SKIN:Positive left middle finger paronychia still has swelling around the cuticle however less erythema noted also  to palpation over cuticle are  NEUROLOGIC EXAM: Awake, A&O x 3, CN's II-XII grossly intact.   PSYCH: Good eye contact, appropriate mood and affect       PROCEDURE:  Once again anesthetized the area with 2% lidocaine using a 27-gauge needle then used a small 27-gauge needle to drain paronychia with pus and blood extracted after first cleaning area well with alcohol swab and Hibiclens.  Some relief of symptoms and pressure    Assessment   1. Finger pain, left        2. Paronychia of finger, left              Treatment or Intervention:  #1  Stressed the importance of keeping area clean and dry with soap and water  #2  Once again went over how to appropriately tape with a Band-Aid to pull the cuticle away from the nail  #3  Continue Bactrim DS 1 pill twice a day for 10 days until done as well as Bactroban 2% cream applied topically to affected area 2-3 times a day   #4  Continue to take over-the-counter Advil liquid gels 1-2 every 8 hours alternating with Tylenol as needed for pain and swelling  #5 Patient advised regarding the risks and/or potential adverse reactions and/or side effects of any prescribed medications along with any over-the-counter medications or any supplements used. Patient advised to seek immediate medical care if any adverse reactions occur. The patient and/or patient(s) parent(s) verbalized their understanding.   #6  PROCEDURE:  Once again anesthetized the area with 2%  lidocaine using a 27-gauge needle then used a small 27-gauge needle to drain paronychia with pus and blood extracted after first cleaning area well with alcohol swab and Hibiclens.  Some relief of symptoms and pressure    #7  If for some reason area gets worse or more painful go to the urgent care or ER  #8 F/U next week for reevaluation  Please note that this report has been produced using speech recognition software.  It may contain errors related to grammar, punctuation or spelling.  Electronically signed, but not reviewed.  MALI Frederick, Director of Sports Medicine    MELISSA MAYBERRY on 11/4/24 at 9:21 AM.     TAHIR Frederick DO

## 2025-02-18 ENCOUNTER — APPOINTMENT (OUTPATIENT)
Dept: OBSTETRICS AND GYNECOLOGY | Facility: CLINIC | Age: 35
End: 2025-02-18
Payer: COMMERCIAL

## 2025-06-02 ENCOUNTER — OFFICE VISIT (OUTPATIENT)
Dept: ORTHOPEDIC SURGERY | Facility: CLINIC | Age: 35
End: 2025-06-02
Payer: COMMERCIAL

## 2025-06-02 ENCOUNTER — HOSPITAL ENCOUNTER (OUTPATIENT)
Dept: RADIOLOGY | Facility: CLINIC | Age: 35
Discharge: HOME | End: 2025-06-02
Payer: COMMERCIAL

## 2025-06-02 VITALS
SYSTOLIC BLOOD PRESSURE: 120 MMHG | WEIGHT: 131 LBS | HEIGHT: 61 IN | HEART RATE: 71 BPM | DIASTOLIC BLOOD PRESSURE: 76 MMHG | BODY MASS INDEX: 24.73 KG/M2

## 2025-06-02 DIAGNOSIS — S63.502A LEFT WRIST SPRAIN, INITIAL ENCOUNTER: ICD-10-CM

## 2025-06-02 DIAGNOSIS — M25.532 LEFT WRIST PAIN: ICD-10-CM

## 2025-06-02 DIAGNOSIS — G56.02 CARPAL TUNNEL SYNDROME OF LEFT WRIST: Primary | ICD-10-CM

## 2025-06-02 PROCEDURE — L3908 WHO COCK-UP NONMOLDE PRE OTS: HCPCS | Performed by: NURSE PRACTITIONER

## 2025-06-02 PROCEDURE — 1036F TOBACCO NON-USER: CPT | Performed by: NURSE PRACTITIONER

## 2025-06-02 PROCEDURE — 99214 OFFICE O/P EST MOD 30 MIN: CPT | Performed by: NURSE PRACTITIONER

## 2025-06-02 PROCEDURE — 73110 X-RAY EXAM OF WRIST: CPT | Mod: LEFT SIDE | Performed by: RADIOLOGY

## 2025-06-02 PROCEDURE — 3008F BODY MASS INDEX DOCD: CPT | Performed by: NURSE PRACTITIONER

## 2025-06-02 PROCEDURE — 73110 X-RAY EXAM OF WRIST: CPT | Mod: LT

## 2025-06-02 ASSESSMENT — ENCOUNTER SYMPTOMS
MYALGIAS: 1
CARDIOVASCULAR NEGATIVE: 1
CONSTITUTIONAL NEGATIVE: 1
OCCASIONAL FEELINGS OF UNSTEADINESS: 0
LOSS OF SENSATION IN FEET: 0
ARTHRALGIAS: 1
RESPIRATORY NEGATIVE: 1
DEPRESSION: 0

## 2025-06-02 ASSESSMENT — PAIN SCALES - GENERAL: PAINLEVEL_OUTOF10: 6

## 2025-06-02 NOTE — PROGRESS NOTES
"Established patient  History Of Present Illness  Caryl Ramachandran is a 34 y.o. female presenting with LEFT wrist pain. Pt states that three weeks ago, while playing softball, when she was batting she \"felt a pop\" in her wrist. Pt has been having pain ever since. Pain located primarily on the radial aspect of the wrist and in to the forearm. Pain with all range of motion but worse with any flexion, radial and ulnar deviation and worse with any restricted and weighted movement. Denies any numbness but did notice tingling sensation in her fingers as of yesterday. Does feel that occasionally her hand is \"colder\" than her other hand. No noticeable change in  strength but does notice on occasion pain and slight difficulty with twisting things open. RIGHT hand dominate. Rate current pain as a 6/10 but notes that it can get worse depending on what she is doing.  We discussed treatment options.  Upon exam patient has indications of carpal tunnel as well as a wrist sprain with extensor and flexor tendinitis.  As such we will fit patient with a cock up splint and she can start into occupational therapy as soon as she is able.  Patient can follow-up in 6 weeks for reevaluation and we can adjust treatment as indicated at that time.  Patient verbalizes understanding and agreement plan of care.    Past Medical History  She has a past medical history of Allergic and Anxiety.    Surgical History  She has no past surgical history on file.     Social History  She reports that she has never smoked. She has never been exposed to tobacco smoke. She has never used smokeless tobacco. She reports that she does not currently use alcohol. She reports that she does not use drugs.    Family History  Family History[1]     Allergies  [place] and Bee venom protein (honey bee)    Review of Systems  Review of Systems   Constitutional: Negative.    Respiratory: Negative.     Cardiovascular: Negative.    Musculoskeletal:  Positive for arthralgias " "and myalgias.   All other systems reviewed and are negative.    Last Recorded Vitals  /76 (BP Location: Right arm, Patient Position: Sitting, BP Cuff Size: Adult)   Pulse 71   Ht 1.549 m (5' 1\")   Wt 59.4 kg (131 lb)   BMI 24.75 kg/m²      The , CAROLA COTTON was present during today's visit and not limited to physical examination!    Examination:  Left Wrist  Erythema: Negative.   Edema: Negative.   Effusion: Negative.   Warmth: Negative.   Ecchymosis/Bruising: Negative.   Percussion Test: Negative.   Tuning Fork Test: Negative.   Abrasions: Negative.   Orientation: Symmetrical.            ROM:   Positive  FROM, Painful    Wrist Flexion (80 degrees)   Wrist Extension (70 degrees)  Wrist Supination (90 degrees)  Wrist Pronation (90 degrees)         Wrist Ulnar Deviation (30 degrees)  Wrist Radial Deviation (20 degrees)          Finger MCP (100 degrees)/ PIP (100 degrees)/ DIP (90 degrees) Flexion  Finger MCP (30 degrees) /PIP (0 degrees) / DIP (20 degrees) Extension          Fingers ABduction (20 degrees)  Fingers ADduction (0 degrees)          Thumb & Fingertip Opposition  Thumb Circumduction.            Muscle Strength: Positive   +4/+5 Wrist Flexion  +4/+5 Wrist Extension        +4/+5 Wrist Supination  +4/+5 Wrist Pronation          +5/+5 Wrist Ulnar Deviation  +5/+5 Wrist Radial Deviation         +5/+5 Finger MCP/PIP/DIP Flexion  +5/+5 Finger MCP/PIP/DIP Extension          +5/+5 Finger Abduction  +5/+5 Finger Adduction          +5/+5 Finger/Thumb Opposition  +5/+5 Thumb Circumduction.            Motor/Neurological:    Normal  Tip of Thumb (Median Nerve)  Tip of 5th Finger (Ulnar Nerve)  Flex and Extend Thumb (Median and Radial Nerve)  Scissor Fingers (Ulnar Nerve)  Raise Thumb Against Resistance on Flat Surface (Median Nerve).          Sensation/Neurological:   Negative, Sensation Intact, 2 Point Discrimination Test Negative         C6: Lateral forearms, thumbs, anterior index finger, lateral " half of the middle finger  C7: Some of posterior index finger, medial half of middle finger, upper posterior back, back of arms  C8: Ring finger, little finger, medial forearm, posterior upper back.     Palpation:   Positive Tenderness to Palpation Left Radial            Vascular:   +2/+4 Radial Pulse  +2/+4 Ulnar Pulse  Capillary Refill < 2 seconds.               Wrist/Hand/Finger - Motor Function:  Trimont-Littler Test: Negative.   Retinacular Test: Negative.   Princh  Test: Negative.   Key  Test: Negative.   Power  Test: Negative.   Gene  Test: Negative.         Wrist/Hand/Finger - Nerve Lesions/Compression Neuropathies:  Carpal Tunnel Sign Test: Positive  Phalen's Sign Test: Positive  Reverse Phalen's Sign Test: Negative.   Wartenberg Sign Test: Negative.   Tinel's Sign Test: Positive  Opposition Test: Negative.   Pinch Test: Negative.   Carpal/Digital Compression Test: Positive  Ochsner Test: Negative.   Froment's Sign Ulnar Nerve Test: Negative.   Intrinsic Ulnar Nerve Test: Negative.   O Test: Negative.         Wrist/Hand/Finger - Stability:  Valgus Stess Test: Negative.   Varus Stess Test: Negative.   Moore Scaphoid Shift Test: Negative.   Scapholunate Ballotment Test: Negative.   Kemar Test: Negative.   Dorsal Capitate Displacement Apprehension Test: Negative.   Shuck Test: Negative.         Wrist/Hand/Finger - Tenosynovitis:  Flexor Digitorum Profundus Test:  Positive  Flexor Digitorum Superficialis Test:  Positive  Flexor Pollicis Longus Test:  Negative.   Flexor Pollicis Brevis Test:  Negative.   Flexor Radial Longus Test:  Negative.   Flexor Radial Brevis Test:  Negative.   Extensor Digitorum Profundus Test:  Positive  Extensor Digitorum Superficialis Test:  Positive  Extensor Pollicis Longus Test:  Negative.   Extensor Pollicis Brevis (EPB)[Dequervain's):  Negative.   Extensor Radial Longus Test:  Negative.   Extensor Radial Brevis Test:  Negative.   Muckard Test:  Negative.    Finklestein Test:  Negative.   Kanavel Sign:  Negative.   Flexor and Extensor Longus Test:  Negative.         Wrist/Hand/Finger - TFCC:  Supination Lift Test: Negative.   Grind Test: Negative.          Imaging and Diagnostics Review:  Plain radiographs ordered and independently reviewed.    Assessment   1. Carpal tunnel syndrome of left wrist    2. Left wrist pain    3. Left wrist sprain, initial encounter        Plan   Treatment or Intervention:  May use RICE therapy as needed  Start into hand/physical therapy 1-2 times a week for 8-10 weeks with manual therapy as well as dry needling and IASTM  Additionally reviewed modifying activities to be performed while exercising as well as activities with daily living  Discussed in detail with the patient to the level of their understanding the possibility in the future of regenerative injections versus corticosteroid injections  Recommendation over-the-counter calcium 500mg, 3 times a day with vitamin-D3 [id number]+ units a day with food as well as a daily multivitamin  Recommendation over-the-counter Move Free for joint health  May take OTC Tylenol Extra Strength or OTC Tylenol Arthritis, taking one every 6-8 hours with food as needed for pain management.   Patient advised regarding the risks and/or potential adverse reactions and/or side effects of any prescribed medications along with any over-the-counter medications or any supplements used. Patient advised to seek immediate medical care if any adverse reactions occur. The patient and/or patient(s) parent(s) verbalized their understanding.  Possibility in future of MRI of LEFT WRIST to rule out tendon vs ligament vs tear vs fracture vs other, MSK to read.     Diagnostic studies:    XR hand left 3+ views  Narrative: Interpreted By:  Triny Myers,   STUDY:  XR HAND LEFT 3+ VIEWS;  11/4/2024 9:34 am      INDICATION:  Signs/Symptoms:LEFT HAND PAIN.      COMPARISON:  None.      ACCESSION NUMBER(S):  [id number]       ORDERING CLINICIAN:  BLANCA PENG      FINDINGS:  No acute fracture or dislocation is seen.      No soft tissue swelling is identified. No erosions or periosteal  reaction is seen.      No radiopaque foreign bodies are seen.      Impression:         Normal exam of the hand.      MACRO:  None      Signed by: Triny Myers 11/6/2024 8:45 PM  Dictation workstation:   [id number]     Activity Instructions, Restrictions, and Accommodations:      Consultations/Referrals:  Occupational therapy    Follow-up:  Follow up 6-8 WEEKS  Total appointment time _30_ minutes. Greater than 50% spent counseling patient on results of physical exam, treatment options as well as results of ordered imaging and treatment for results, need for PT and expected outcomes, as well as discussing possible medications.    Owen Watts CNP           [1]   Family History  Problem Relation Name Age of Onset    No Known Problems Mother      Colon cancer Father Oskar watts     No Known Problems Sister      No Known Problems Sister      No Known Problems Sister      No Known Problems Sister      No Known Problems Sister      No Known Problems Brother      No Known Problems Brother      No Known Problems Brother      No Known Problems Brother      No Known Problems Brother      No Known Problems Son      No Known Problems Son      No Known Problems Son

## 2025-07-14 ENCOUNTER — OFFICE VISIT (OUTPATIENT)
Dept: ORTHOPEDIC SURGERY | Facility: CLINIC | Age: 35
End: 2025-07-14
Payer: COMMERCIAL

## 2025-07-14 ENCOUNTER — HOSPITAL ENCOUNTER (OUTPATIENT)
Dept: RADIOLOGY | Facility: CLINIC | Age: 35
Discharge: HOME | End: 2025-07-14
Payer: COMMERCIAL

## 2025-07-14 VITALS
BODY MASS INDEX: 24.55 KG/M2 | HEIGHT: 61 IN | DIASTOLIC BLOOD PRESSURE: 88 MMHG | WEIGHT: 130 LBS | SYSTOLIC BLOOD PRESSURE: 128 MMHG

## 2025-07-14 DIAGNOSIS — R10.9 FLANK PAIN: ICD-10-CM

## 2025-07-14 DIAGNOSIS — R10.9 FLANK PAIN: Primary | ICD-10-CM

## 2025-07-14 PROCEDURE — 99214 OFFICE O/P EST MOD 30 MIN: CPT | Performed by: NURSE PRACTITIONER

## 2025-07-14 PROCEDURE — 1036F TOBACCO NON-USER: CPT | Performed by: NURSE PRACTITIONER

## 2025-07-14 PROCEDURE — 3008F BODY MASS INDEX DOCD: CPT | Performed by: NURSE PRACTITIONER

## 2025-07-14 PROCEDURE — 99213 OFFICE O/P EST LOW 20 MIN: CPT | Performed by: NURSE PRACTITIONER

## 2025-07-14 PROCEDURE — 74018 RADEX ABDOMEN 1 VIEW: CPT

## 2025-07-14 PROCEDURE — 74018 RADEX ABDOMEN 1 VIEW: CPT | Performed by: RADIOLOGY

## 2025-07-14 ASSESSMENT — ENCOUNTER SYMPTOMS
LOSS OF SENSATION IN FEET: 0
ARTHRALGIAS: 1
OCCASIONAL FEELINGS OF UNSTEADINESS: 0
RESPIRATORY NEGATIVE: 1
CARDIOVASCULAR NEGATIVE: 1
CONSTITUTIONAL NEGATIVE: 1
DEPRESSION: 0
MYALGIAS: 1

## 2025-07-14 ASSESSMENT — PATIENT HEALTH QUESTIONNAIRE - PHQ9
1. LITTLE INTEREST OR PLEASURE IN DOING THINGS: NOT AT ALL
2. FEELING DOWN, DEPRESSED OR HOPELESS: NOT AT ALL
SUM OF ALL RESPONSES TO PHQ9 QUESTIONS 1 AND 2: 0

## 2025-07-14 ASSESSMENT — PAIN SCALES - GENERAL: PAINLEVEL_OUTOF10: 10-WORST PAIN EVER

## 2025-07-14 NOTE — PROGRESS NOTES
"Established patient  History Of Present Illness  Caryl Ramachandran is a 34 y.o. female presenting with kidney pain. Pt has been having pain since last night in to early this morning. Pain located on RIGHT lower side of back. Pt says \"living hurts\". Rates current pain as a 10+/10. No previous history of UTI or kidney stones.  We obtained a KUB x-ray which showed no acute pathology.  We will also order a urinalysis with reflex to culture and can adjust treatment as indicated at that time.    Past Medical History  She has a past medical history of Allergic and Anxiety.    Surgical History  She has no past surgical history on file.     Social History  She reports that she has never smoked. She has never been exposed to tobacco smoke. She has never used smokeless tobacco. She reports that she does not currently use alcohol. She reports that she does not use drugs.    Family History  Family History[1]     Allergies  [place] and Bee venom protein (honey bee)    Review of Systems  Review of Systems   Constitutional: Negative.    Respiratory: Negative.     Cardiovascular: Negative.    Musculoskeletal:  Positive for arthralgias and myalgias.   All other systems reviewed and are negative.       Last Recorded Vitals  /88 (BP Location: Right arm)   Ht (!) 1.549 m (5' 1\")   Wt 59 kg (130 lb)   BMI 24.56 kg/m²      Physical Exam  Constitutional:       Appearance: Normal appearance.   Cardiovascular:      Rate and Rhythm: Normal rate and regular rhythm.   Pulmonary:      Effort: Pulmonary effort is normal.      Breath sounds: Normal breath sounds.   Abdominal:      General: Abdomen is flat. Bowel sounds are normal.      Palpations: Abdomen is soft.      Tenderness: There is abdominal tenderness. There is guarding.   Musculoskeletal:         General: Normal range of motion.   Skin:     General: Skin is warm and dry.   Neurological:      Mental Status: She is alert.       Ortho Exam    Imaging and Diagnostics Review:  No new " radiographs were obtained today.    Assessment   1. Flank pain        Plan   Treatment or Intervention:      Diagnostic studies:      Follow-up:  Follow up as needed    CAROLA MORENO on 7/14/25 at 3:13 PM.     Owen Watts CNP          [1]   Family History  Problem Relation Name Age of Onset    No Known Problems Mother      Colon cancer Father Oskar watts     No Known Problems Sister      No Known Problems Sister      No Known Problems Sister      No Known Problems Sister      No Known Problems Sister      No Known Problems Brother      No Known Problems Brother      No Known Problems Brother      No Known Problems Brother      No Known Problems Brother      No Known Problems Son      No Known Problems Son      No Known Problems Son

## 2025-07-15 DIAGNOSIS — N12 PYELONEPHRITIS: Primary | ICD-10-CM

## 2025-07-15 RX ORDER — CIPROFLOXACIN 500 MG/1
500 TABLET, FILM COATED ORAL 2 TIMES DAILY
Qty: 14 TABLET | Refills: 0 | Status: SHIPPED | OUTPATIENT
Start: 2025-07-15 | End: 2025-07-22

## 2025-07-15 RX ORDER — ONDANSETRON 8 MG/1
8 TABLET, ORALLY DISINTEGRATING ORAL EVERY 12 HOURS PRN
Qty: 20 TABLET | Refills: 0 | Status: SHIPPED | OUTPATIENT
Start: 2025-07-15 | End: 2025-08-14

## 2025-07-15 NOTE — PROGRESS NOTES
Reviewed patient's lab results with indications of a possible pyelonephritis.  Will start patient on Cipro and she was educated to the concerns for adverse reactions or effects.  Will also provide patient prescription for Zofran for her new onset of nausea and vomiting.  Patient will go to the emergency room for any worsening symptoms.

## 2025-07-16 LAB
APPEARANCE UR: ABNORMAL
BACTERIA #/AREA URNS HPF: ABNORMAL /HPF
BACTERIA UR CULT: ABNORMAL
BACTERIA UR CULT: ABNORMAL
BILIRUB UR QL STRIP: NEGATIVE
COLOR UR: ABNORMAL
GLUCOSE UR QL STRIP: NEGATIVE
HGB UR QL STRIP: ABNORMAL
HYALINE CASTS #/AREA URNS LPF: ABNORMAL /LPF
KETONES UR QL STRIP: ABNORMAL
LEUKOCYTE ESTERASE UR QL STRIP: ABNORMAL
NITRITE UR QL STRIP: NEGATIVE
PH UR STRIP: 7.5 [PH] (ref 5–8)
PROT UR QL STRIP: ABNORMAL
RBC #/AREA URNS HPF: ABNORMAL /HPF
SERVICE CMNT-IMP: ABNORMAL
SP GR UR STRIP: 1.02 (ref 1–1.03)
SQUAMOUS #/AREA URNS HPF: ABNORMAL /HPF
TRI-PHOS CRY #/AREA URNS HPF: ABNORMAL /HPF
WBC #/AREA URNS HPF: ABNORMAL /HPF

## 2025-07-20 ASSESSMENT — ENCOUNTER SYMPTOMS
DIFFICULTY URINATING: 0
SHORTNESS OF BREATH: 0
FEVER: 0
ENDOCRINE NEGATIVE: 1
NAUSEA: 0
DIARRHEA: 0
DIZZINESS: 0

## 2025-07-20 NOTE — PROGRESS NOTES
"Subjective   Patient ID: Caryl Ramachandran is a 34 y.o. female who presents for follow up UTI and kidney stone    HPI   Patient is presenting to the clinic to follow up UTI and kidney stone.     Overall doing well. Last week she had a kidney stone.   UTI    -Dermatitis: Today she reports having a red rash that started a week ago. Taking Cipro. Pt thinks this could be the cause of the rash. States today is her last day taking this medication.     -Anxiety: UnControlled. No suicidal thoughts/ideation. Pt states that she manages Anxiety and stress  with FLUoxetine 40 mg. She mentions that sometimes Fluoxetine does not work for her. She attests that she does not have anxiety attacks often. also on Buspar 10 mg.    Pt had a UA 7/14/25 that was positive --kidney stones and pyelonephritis-pain resolved    Review of Systems   Constitutional:  Negative for fever.        Also see HPI   Eyes:  Negative for visual disturbance.   Respiratory:  Negative for shortness of breath.    Cardiovascular:  Negative for chest pain.   Gastrointestinal:  Negative for diarrhea and nausea.   Endocrine: Negative.    Genitourinary:  Negative for difficulty urinating.   Skin:  Positive for rash.   Neurological:  Negative for dizziness.        No focal deficits   Psychiatric/Behavioral:  Negative for suicidal ideas.    All other systems reviewed and are negative.      Objective   /78   Pulse 92   Temp 36.7 °C (98.1 °F)   Ht (!) 1.549 m (5' 1\")   Wt 63.4 kg (139 lb 12.8 oz)   SpO2 97%   BMI 26.41 kg/m²     Physical Exam  Vitals and nursing note reviewed.   Constitutional:       Appearance: Normal appearance.   HENT:      Head: Normocephalic and atraumatic.     Eyes:      Conjunctiva/sclera: Conjunctivae normal.       Cardiovascular:      Rate and Rhythm: Normal rate and regular rhythm.      Heart sounds: Normal heart sounds.   Pulmonary:      Effort: Pulmonary effort is normal.      Breath sounds: Normal breath sounds.     Skin:     " Comments: Red irritated skin on trunk-looks like drug eruption     Neurological:      Mental Status: She is oriented to person, place, and time.     Psychiatric:         Mood and Affect: Mood normal.         Behavior: Behavior normal.         Assessment/Plan   Diagnoses and all orders for this visit:  Anxiety  -     busPIRone (Buspar) 30 mg tablet; Take 1 tablet (30 mg) by mouth 2 times a day.  -     FLUoxetine (PROzac) 20 mg capsule; Take 3 capsules (60 mg) by mouth once daily.  Dermatitis  -     predniSONE (Deltasone) 10 mg tablet; Take 6 tablets (60 mg) by mouth once daily for 1 day, THEN 5 tablets (50 mg) once daily for 1 day, THEN 4 tablets (40 mg) once daily for 1 day, THEN 3 tablets (30 mg) once daily for 1 day, THEN 2 tablets (20 mg) once daily for 1 day, THEN 1 tablet (10 mg) once daily for 1 day.       Scribe Attestation  By signing my name below, IBrianda Scribe   attest that this documentation has been prepared under the direction and in the presence of Gerard Villa DO.

## 2025-07-21 ENCOUNTER — OFFICE VISIT (OUTPATIENT)
Dept: PRIMARY CARE | Facility: CLINIC | Age: 35
End: 2025-07-21
Payer: COMMERCIAL

## 2025-07-21 VITALS
TEMPERATURE: 98.1 F | HEIGHT: 61 IN | SYSTOLIC BLOOD PRESSURE: 126 MMHG | WEIGHT: 139.8 LBS | DIASTOLIC BLOOD PRESSURE: 78 MMHG | OXYGEN SATURATION: 97 % | HEART RATE: 92 BPM | BODY MASS INDEX: 26.39 KG/M2

## 2025-07-21 DIAGNOSIS — F41.9 ANXIETY: Primary | ICD-10-CM

## 2025-07-21 DIAGNOSIS — L30.9 DERMATITIS: ICD-10-CM

## 2025-07-21 PROCEDURE — 99213 OFFICE O/P EST LOW 20 MIN: CPT | Performed by: FAMILY MEDICINE

## 2025-07-21 PROCEDURE — 3008F BODY MASS INDEX DOCD: CPT | Performed by: FAMILY MEDICINE

## 2025-07-21 PROCEDURE — 1036F TOBACCO NON-USER: CPT | Performed by: FAMILY MEDICINE

## 2025-07-21 RX ORDER — PREDNISONE 10 MG/1
TABLET ORAL
Qty: 21 TABLET | Refills: 0 | Status: SHIPPED | OUTPATIENT
Start: 2025-07-21 | End: 2025-07-27

## 2025-07-21 RX ORDER — FLUOXETINE 20 MG/1
60 CAPSULE ORAL DAILY
Qty: 90 CAPSULE | Refills: 11 | Status: SHIPPED | OUTPATIENT
Start: 2025-07-21

## 2025-07-21 RX ORDER — BUSPIRONE HYDROCHLORIDE 30 MG/1
30 TABLET ORAL 2 TIMES DAILY
Qty: 60 TABLET | Refills: 11 | Status: SHIPPED | OUTPATIENT
Start: 2025-07-21

## 2025-07-21 ASSESSMENT — PATIENT HEALTH QUESTIONNAIRE - PHQ9
1. LITTLE INTEREST OR PLEASURE IN DOING THINGS: NOT AT ALL
SUM OF ALL RESPONSES TO PHQ9 QUESTIONS 1 AND 2: 0
2. FEELING DOWN, DEPRESSED OR HOPELESS: NOT AT ALL

## 2025-07-21 ASSESSMENT — PAIN SCALES - GENERAL: PAINLEVEL_OUTOF10: 0-NO PAIN

## 2025-08-05 ENCOUNTER — APPOINTMENT (OUTPATIENT)
Dept: OBSTETRICS AND GYNECOLOGY | Facility: CLINIC | Age: 35
End: 2025-08-05
Payer: COMMERCIAL

## 2025-08-06 ENCOUNTER — PATIENT MESSAGE (OUTPATIENT)
Dept: PRIMARY CARE | Facility: CLINIC | Age: 35
End: 2025-08-06
Payer: COMMERCIAL

## 2025-08-07 ENCOUNTER — TELEPHONE (OUTPATIENT)
Dept: PRIMARY CARE | Facility: CLINIC | Age: 35
End: 2025-08-07
Payer: COMMERCIAL

## 2025-08-07 DIAGNOSIS — N30.00 ACUTE CYSTITIS WITHOUT HEMATURIA: Primary | ICD-10-CM

## 2025-08-07 RX ORDER — CEPHALEXIN 500 MG/1
500 CAPSULE ORAL 3 TIMES DAILY
Qty: 21 CAPSULE | Refills: 0 | Status: SHIPPED | OUTPATIENT
Start: 2025-08-07 | End: 2025-08-14

## 2026-01-07 ENCOUNTER — APPOINTMENT (OUTPATIENT)
Dept: OBSTETRICS AND GYNECOLOGY | Facility: CLINIC | Age: 36
End: 2026-01-07
Payer: COMMERCIAL